# Patient Record
Sex: FEMALE | Race: BLACK OR AFRICAN AMERICAN
[De-identification: names, ages, dates, MRNs, and addresses within clinical notes are randomized per-mention and may not be internally consistent; named-entity substitution may affect disease eponyms.]

---

## 2017-07-10 ENCOUNTER — HOSPITAL ENCOUNTER (EMERGENCY)
Dept: HOSPITAL 17 - NEPE | Age: 27
LOS: 1 days | Discharge: HOME | End: 2017-07-11
Payer: SELF-PAY

## 2017-07-10 VITALS
RESPIRATION RATE: 16 BRPM | OXYGEN SATURATION: 100 % | HEART RATE: 90 BPM | DIASTOLIC BLOOD PRESSURE: 63 MMHG | TEMPERATURE: 99.8 F | SYSTOLIC BLOOD PRESSURE: 127 MMHG

## 2017-07-10 VITALS — HEIGHT: 64 IN | WEIGHT: 165.35 LBS | BODY MASS INDEX: 28.23 KG/M2

## 2017-07-10 DIAGNOSIS — Z72.0: ICD-10-CM

## 2017-07-10 DIAGNOSIS — Z79.899: ICD-10-CM

## 2017-07-10 DIAGNOSIS — N39.0: Primary | ICD-10-CM

## 2017-07-10 DIAGNOSIS — R11.2: ICD-10-CM

## 2017-07-10 LAB
BASOPHILS # BLD AUTO: 0 TH/MM3 (ref 0–0.2)
BASOPHILS NFR BLD: 0.4 % (ref 0–2)
COLOR UR: YELLOW
COMMENT (UR): (no result)
CULTURE IF INDICATED: (no result)
EOSINOPHIL # BLD: 0 TH/MM3 (ref 0–0.4)
EOSINOPHIL NFR BLD: 0.2 % (ref 0–4)
ERYTHROCYTE [DISTWIDTH] IN BLOOD BY AUTOMATED COUNT: 17.9 % (ref 11.6–17.2)
GLUCOSE UR STRIP-MCNC: (no result) MG/DL
HCT VFR BLD CALC: 31.3 % (ref 35–46)
HEMO FLAGS: (no result)
HGB UR QL STRIP: (no result)
KETONES UR STRIP-MCNC: 40 MG/DL
LYMPHOCYTES # BLD AUTO: 1.3 TH/MM3 (ref 1–4.8)
LYMPHOCYTES NFR BLD AUTO: 13.6 % (ref 9–44)
MCH RBC QN AUTO: 18.5 PG (ref 27–34)
MCHC RBC AUTO-ENTMCNC: 30.8 % (ref 32–36)
MCV RBC AUTO: 60.2 FL (ref 80–100)
MONOCYTES NFR BLD: 5.4 % (ref 0–8)
MUCOUS THREADS #/AREA URNS LPF: (no result) /LPF
NEUTROPHILS # BLD AUTO: 7.9 TH/MM3 (ref 1.8–7.7)
NEUTROPHILS NFR BLD AUTO: 80.4 % (ref 16–70)
NITRITE UR QL STRIP: (no result)
PLATELET # BLD: 336 TH/MM3 (ref 150–450)
RBC # BLD AUTO: 5.19 MIL/MM3 (ref 4–5.3)
SP GR UR STRIP: 1.03 (ref 1–1.03)
SQUAMOUS #/AREA URNS HPF: 8 /HPF (ref 0–5)
TRANS CELLS #/AREA URNS HPF: <1 /HPF
WBC # BLD AUTO: 9.8 TH/MM3 (ref 4–11)

## 2017-07-10 PROCEDURE — 83690 ASSAY OF LIPASE: CPT

## 2017-07-10 PROCEDURE — 80053 COMPREHEN METABOLIC PANEL: CPT

## 2017-07-10 PROCEDURE — 85025 COMPLETE CBC W/AUTO DIFF WBC: CPT

## 2017-07-10 PROCEDURE — 81001 URINALYSIS AUTO W/SCOPE: CPT

## 2017-07-10 PROCEDURE — 84703 CHORIONIC GONADOTROPIN ASSAY: CPT

## 2017-07-10 PROCEDURE — 99284 EMERGENCY DEPT VISIT MOD MDM: CPT

## 2017-07-10 PROCEDURE — 96361 HYDRATE IV INFUSION ADD-ON: CPT

## 2017-07-10 PROCEDURE — 87086 URINE CULTURE/COLONY COUNT: CPT

## 2017-07-10 PROCEDURE — 96374 THER/PROPH/DIAG INJ IV PUSH: CPT

## 2017-07-10 NOTE — PD
HPI


Chief Complaint:  Fever


Time Seen by Provider:  23:15


Travel History


International Travel<30 days:  No


Contact w/Intl Traveler<30days:  No


Traveled to known affect area:  No





History of Present Illness


HPI


27-year-old female came to the emergency room with history of fever, nausea and 

vomiting since this morning.  Patient says she vomited 6-7 times.  The last 

vomitus was half an hour ago before coming to the hospital.  No history of 

diarrhea.  Her temperature in triage was 99.  Patient did not take any 

antipyretics before coming in.  She is otherwise a healthy person.





[facility]


Past Medical History


*** Narrative Medical


List of her past medical, surgical, social and family history is reviewed from 

the nursing note.


Medical History:  Denies Significant Hx


Diminished Hearing:  No


Immunizations Current:  Yes


Tetanus Vaccination:  < 5 Years


Influenza Vaccination:  Yes


Pregnant?:  Not Pregnant


LMP:  7/10/17


:  2


Para:  2


Miscarriage:  0


:  0





Past Surgical History


Surgical History:  No Previous Surgery





Social History


Alcohol Use:  Yes (OCC)


Tobacco Use:  Yes


Substance Use:  No





Allergies-Medications


(Allergen,Severity, Reaction):  


Uncoded Allergies:  


     CITRUS FRUIT (Allergy, Severe, ANAPHYLAXIS, 09)


Comments


List of her allergies reviewed from the nursing note.


Reported Meds & Prescriptions





Reported Meds & Active Scripts


Active


Zofran Odt (Ondansetron Odt) 4 Mg Tab 4 Mg SL Q6HR PRN


Macrobid (Nitrofurantoin Monoh/Nitrofur Macro) 100 Mg Cap 100 Mg PO BID 10 Days





Narrative Medication


List of her home medications reviewed from the nursing note.





Review of Systems


Except as stated in HPI:  all other systems reviewed are Neg





Physical Exam


Narrative


GENERAL: Awake, alert, moderate distress


SKIN: Focused skin assessment warm/dry.


HEAD: Atraumatic. Normocephalic. 


EYES: Pupils equal and round. No scleral icterus. No injection or drainage. 


ENT: No nasal bleeding or discharge.  Mucous membranes pink and moist.


NECK: Trachea midline. No JVD. 


CARDIOVASCULAR: Regular rate and rhythm.  No murmur appreciated.


RESPIRATORY: No accessory muscle use. Clear to auscultation. Breath sounds 

equal bilaterally. 


GASTROINTESTINAL: Abdomen soft, non-tender, nondistended. Hepatic and splenic 

margins not palpable. 


MUSCULOSKELETAL: No obvious deformities. No clubbing.  No cyanosis.  No edema. 


NEUROLOGICAL: Awake and alert. No obvious cranial nerve deficits.  Motor 

grossly within normal limits. Normal speech.


PSYCHIATRIC: Appropriate mood and affect; insight and judgment normal.





Data


Data


Last Documented VS





Vital Signs








  Date Time  Temp Pulse Resp B/P Pulse Ox O2 Delivery O2 Flow Rate FiO2


 


17 00:45      Room Air  


 


7/10/17 23:16     97   


 


7/10/17 22:10 99.8 90 16 127/63    








Orders





 Complete Blood Count With Diff (7/10/17 23:18)


Comprehensive Metabolic Panel (7/10/17 23:18)


Lipase (7/10/17 23:18)


Urinalysis - C+S If Indicated (7/10/17 23:18)


Iv Access Insert/Monitor (7/10/17 23:18)


Ecg Monitoring (7/10/17 23:18)


Oximetry (7/10/17 23:18)


Ondansetron Inj (Zofran Inj) (7/10/17 23:30)


Sodium Chlor 0.9% 1000 Ml Inj (Ns 1000 M (7/10/17 23:18)


Sodium Chloride 0.9% Flush (Ns Flush) (7/10/17 23:30)


Ed Urine Pregnancytest Poc (7/10/17 23:18)


Urine Culture (7/10/17 23:43)


Nitrofurantoin Monohyd Macrocr (Macrobid (17 00:15)





Labs





 Laboratory Tests








Test 7/10/17





 23:43


 


White Blood Count 9.8 TH/MM3


 


Red Blood Count 5.19 MIL/MM3


 


Hemoglobin 9.6 GM/DL


 


Hematocrit 31.3 %


 


Mean Corpuscular Volume 60.2 FL


 


Mean Corpuscular Hemoglobin 18.5 PG


 


Mean Corpuscular Hemoglobin 30.8 %





Concent 


 


Red Cell Distribution Width 17.9 %


 


Platelet Count 336 TH/MM3


 


Mean Platelet Volume 9.8 FL


 


Neutrophils (%) (Auto) 80.4 %


 


Lymphocytes (%) (Auto) 13.6 %


 


Monocytes (%) (Auto) 5.4 %


 


Eosinophils (%) (Auto) 0.2 %


 


Basophils (%) (Auto) 0.4 %


 


Neutrophils # (Auto) 7.9 TH/MM3


 


Lymphocytes # (Auto) 1.3 TH/MM3


 


Monocytes # (Auto) 0.5 TH/MM3


 


Eosinophils # (Auto) 0.0 TH/MM3


 


Basophils # (Auto) 0.0 TH/MM3


 


CBC Comment DIFF FINAL 


 


Differential Comment  


 


Urine Color YELLOW 


 


Urine Turbidity HAZY 


 


Urine pH 6.0 


 


Urine Specific Gravity 1.032 


 


Urine Protein 30 mg/dL


 


Urine Glucose (UA) NEG mg/dL


 


Urine Ketones 40 mg/dL


 


Urine Occult Blood NEG 


 


Urine Nitrite NEG 


 


Urine Bilirubin NEG 


 


Urine Urobilinogen 2.0 MG/DL


 


Urine Leukocyte Esterase LARGE 


 


Urine RBC 3 /hpf


 


Urine WBC 23 /hpf


 


Urine Squamous Epithelial 8 /hpf





Cells 


 


Urine Transitional Epithelial <1 /hpf





Cells 


 


Urine Mucus MANY /lpf


 


Microscopic Urinalysis Comment CULTURE





 INDICATED


 


Sodium Level 135 MEQ/L


 


Potassium Level 3.6 MEQ/L


 


Chloride Level 104 MEQ/L


 


Carbon Dioxide Level 21.4 MEQ/L


 


Anion Gap 10 MEQ/L


 


Blood Urea Nitrogen 11 MG/DL


 


Creatinine 0.87 MG/DL


 


Estimat Glomerular Filtration 96 ML/MIN





Rate 


 


Random Glucose 102 MG/DL


 


Calcium Level 8.5 MG/DL


 


Total Bilirubin 0.4 MG/DL


 


Aspartate Amino Transf 18 U/L





(AST/SGOT) 


 


Alanine Aminotransferase 16 U/L





(ALT/SGPT) 


 


Alkaline Phosphatase 75 U/L


 


Total Protein 8.0 GM/DL


 


Albumin 3.5 GM/DL


 


Lipase 92 U/L











[facility]


Medical Decision Making


Medical Screen Exam Complete:  Yes


Emergency Medical Condition:  Yes


Medical Record Reviewed:  Yes


Differential Diagnosis


Acute gastritis, viral illness, UTI, dehydration


Narrative Course


12:50 AM the test results of back and they are within acceptable limits.  UA is 

positive for UTI.  Patient was given IV fluid bolus and IV Zofran.  I have 

given her by mouth Macrobid as well.  She will be discharged home on 

prescription for Zofran and Macrobid.





Procedures


EKG Prior to Arrival:  No





Diagnosis





 Primary Impression:  


 UTI (urinary tract infection)


 Qualified Code:  N39.0 - Urinary tract infection without hematuria, site 

unspecified


 Additional Impression:  


 Vomiting


 Qualified Code:  R11.2 - Nausea and vomiting, intractability of vomiting not 

specified, unspecified vomiting type


Referrals:  


Primary Care Physician





***Additional Instructions:


Please return to the ER if the condition worsens or any other new concerns.  

Otherwise follow-up with your primary care in couple days.  Take the 

medications as per the prescription direction.  Drink lots of fluid to keep 

yourself hydrated.


***Med/Other Pt SpecificInfo:  Prescription(s) given


Scripts


Ondansetron Odt (Zofran Odt)4 Mg Tab4 Mg SL Q6HR PRN (Nausea/Vomiting) #15 TAB  

Ref 0


   Prov:Stephen Gary MD         17 


Nitrofurantoin Monohydrate Macrocrystals (Macrobid)100 Mg [id number] Mg PO BID  10 

Days  Ref 0


   Prov:Stephen Gary MD         17


Disposition:  01 DISCHARGE HOME


Condition:  Stable








Stephen Gary MD Jul 10, 2017 23:15





Stephen Gary MD Jul 10, 2017 23:15

## 2017-07-11 LAB
ALP SERPL-CCNC: 75 U/L (ref 45–117)
ALT SERPL-CCNC: 16 U/L (ref 10–53)
ANION GAP SERPL CALC-SCNC: 10 MEQ/L (ref 5–15)
AST SERPL-CCNC: 18 U/L (ref 15–37)
BILIRUB SERPL-MCNC: 0.4 MG/DL (ref 0.2–1)
BUN SERPL-MCNC: 11 MG/DL (ref 7–18)
CHLORIDE SERPL-SCNC: 104 MEQ/L (ref 98–107)
GFR SERPLBLD BASED ON 1.73 SQ M-ARVRAT: 96 ML/MIN (ref 89–?)
HCO3 BLD-SCNC: 21.4 MEQ/L (ref 21–32)
POTASSIUM SERPL-SCNC: 3.6 MEQ/L (ref 3.5–5.1)
SODIUM SERPL-SCNC: 135 MEQ/L (ref 136–145)

## 2017-07-28 ENCOUNTER — HOSPITAL ENCOUNTER (EMERGENCY)
Dept: HOSPITAL 17 - NEPA | Age: 27
Discharge: HOME | End: 2017-07-28
Payer: SELF-PAY

## 2017-07-28 VITALS
TEMPERATURE: 101 F | OXYGEN SATURATION: 98 % | RESPIRATION RATE: 15 BRPM | HEART RATE: 93 BPM | DIASTOLIC BLOOD PRESSURE: 55 MMHG | SYSTOLIC BLOOD PRESSURE: 116 MMHG

## 2017-07-28 DIAGNOSIS — J02.9: Primary | ICD-10-CM

## 2017-07-28 DIAGNOSIS — Z72.0: ICD-10-CM

## 2017-07-28 PROCEDURE — 99283 EMERGENCY DEPT VISIT LOW MDM: CPT

## 2017-07-28 NOTE — PD
HPI


Chief Complaint:  ENT Complaint


Time Seen by Provider:  19:49


Travel History


International Travel<30 days:  No


Contact w/Intl Traveler<30days:  No


Traveled to known affect area:  No





History of Present Illness


HPI


27-year-old female presents to the emergency department for evaluation of sore 

throat and fever that started yesterday morning.  Her daughter was seen here 

pediatrics and was diagnosed with strep pharyngitis.  She has the same 

symptoms.  The patient has not taken anything for her fever.  She denies any 

chest or shortness of breath.  No abdominal pain.  No diarrhea.  She reports no 

chronic medical problems and takes no prescribed medications.  Patient has no 

other complaints at this time.





[facility]


Past Medical History


Medical History:  Denies Significant Hx


Diminished Hearing:  No


Immunizations Current:  Yes


Pregnant?:  Not Pregnant


:  2


Para:  2


Miscarriage:  0


:  0





Past Surgical History


Surgical History:  No Previous Surgery





Social History


Alcohol Use:  Yes (OCC)


Tobacco Use:  Yes


Substance Use:  No





Allergies-Medications


(Allergen,Severity, Reaction):  


Uncoded Allergies:  


     CITRUS FRUIT (Allergy, Severe, ANAPHYLAXIS, 09)


Reported Meds & Prescriptions





Reported Meds & Active Scripts


Active








Review of Systems


Except as stated in HPI:  all other systems reviewed are Neg





Physical Exam


Narrative


GENERAL: Well-nourished, well-developed female patient, ambulatory.  Afebrile.


SKIN: Focused skin assessment warm/dry.


HEAD: Normocephalic.  Atraumatic.


ENT: Mucosa pink and moist.  Bilateral tonsils are erythematous with exudates. 

No uvular edema. No uvular, palatal, or tonsillar deviation. Airway patent. 

Nasal turbinates appear normal without nasal blood, purulent drainage or septal 

hematoma.  Bilateral tympanic membranes are clear without erythema or 

perforation.


EYES: No scleral icterus. No injection or drainage. 


NECK: Supple, trachea midline. No JVD or lymphadenopathy.


CARDIOVASCULAR: Regular rate and rhythm without murmurs, gallops, or rubs. 


RESPIRATORY: Breath sounds equal bilaterally. No accessory muscle use.  Lungs 

sounds are clear to auscultation.


GASTROINTESTINAL: Abdomen soft, non-tender, nondistended. 


MUSCULOSKELETAL: No cyanosis, or edema. 


BACK: Nontender without obvious deformity. No CVA tenderness.





Data


Data


Last Documented VS





Vital Signs








  Date Time  Temp Pulse Resp B/P Pulse Ox O2 Delivery O2 Flow Rate FiO2


 


17 19:09 101.0 93 15 116/55 98 Room Air  











[facility]


Medical Decision Making


Medical Screen Exam Complete:  Yes


Emergency Medical Condition:  Yes


Medical Record Reviewed:  Yes


Differential Diagnosis


Strep pharyngitis versus viral pharyngitis versus URI


Narrative Course


27-year-old female presents to the emergency department for evaluation of sore 

throat and fever that started yesterday morning.  Her daughter was just seen in 

pediatrics and diagnosed with strep pharyngitis.  She has the same symptoms.  

Patient is given ibuprofen 600 mg by mouth and Pen-Vee K 500 mg by mouth 

tablet.  She will be discharged prescription for pen VK.  She is instructed to 

take Tylenol and ibuprofen over-the-counter as needed and to warm somewhat 

water gargles.  She is to follow-up with a primary care physician or return for 

any acute worsening of symptoms.  Patient verbalizes agreement and 

understanding.





The patient was discharged in stable condition with instructions, including 

return instructions and follow up instructions.





Diagnosis





 Primary Impression:  


 Exudative pharyngitis


Referrals:  


Primary Care Physician


call for appointment


Patient Instructions:  General Instructions, Pharyngitis (ED)





***Additional Instructions:


Take antibiotic as directed until gone.  This is free at [facility].


Over-the-counter Tylenol every 4 hours as needed for fever, pain.  Over-the-

counter ibuprofen every 6-8 hours as needed for fever, pain.


Warm saltwater gargles.


Follow-up with your primary care physician.


Return to the emergency department for any acute worsening of symptoms.


***Med/Other Pt SpecificInfo:  Prescription(s) given


Scripts


Penicillin V Potassium 500 Mg [id number] Mg PO Q8H  10 Days  Ref 0


   Prov:Julia Castillo         17


Disposition:  01 DISCHARGE HOME


Condition:  Stable








Julia Castillo 2017 19:53

## 2018-03-08 ENCOUNTER — HOSPITAL ENCOUNTER (EMERGENCY)
Dept: HOSPITAL 17 - PHEFT | Age: 28
Discharge: HOME | End: 2018-03-08
Payer: SELF-PAY

## 2018-03-08 VITALS
HEART RATE: 75 BPM | RESPIRATION RATE: 18 BRPM | OXYGEN SATURATION: 100 % | TEMPERATURE: 99.4 F | DIASTOLIC BLOOD PRESSURE: 69 MMHG | SYSTOLIC BLOOD PRESSURE: 139 MMHG

## 2018-03-08 VITALS — BODY MASS INDEX: 26.01 KG/M2 | HEIGHT: 64 IN | WEIGHT: 152.34 LBS

## 2018-03-08 DIAGNOSIS — K08.89: ICD-10-CM

## 2018-03-08 DIAGNOSIS — O99.619: Primary | ICD-10-CM

## 2018-03-08 DIAGNOSIS — Z3A.00: ICD-10-CM

## 2018-03-08 PROCEDURE — 99283 EMERGENCY DEPT VISIT LOW MDM: CPT

## 2018-03-08 NOTE — PD
HPI


Chief Complaint:  Oral / Dental Pain or Problem


Time Seen by Provider:  20:01


Travel History


International Travel<30 days:  No


Contact w/Intl Traveler<30days:  No


Traveled to known affect area:  No





History of Present Illness


HPI


This is a 27-year-old female here with right sided dental pain 2 days.  She 

reports she has a decayed tooth at the site of the pain.  No fever or chills.  

Severity is mild.  Aggravated by hot/cold fluids and showing.  No alleviating 

factors.  Patient reports she took 2 home pregnancy tests today.  She denies 

any abdominal pain, vaginal bleeding, vaginal discharge.





[facility]


Past Medical History


Medical History:  Denies Significant Hx


Diminished Hearing:  No


Immunizations Current:  Yes


Tetanus Vaccination:  < 5 Years


Influenza Vaccination:  No


Pregnant?:  Pregnant


LMP:  18


:  2


Para:  2


Miscarriage:  0


:  0





Past Surgical History


Surgical History:  No Previous Surgery





Social History


Alcohol Use:  No


Tobacco Use:  No


Substance Use:  No





Allergies-Medications


(Allergen,Severity, Reaction):  


Uncoded Allergies:  


     CITRUS FRUIT (Allergy, Severe, ANAPHYLAXIS, 09)


Reported Meds & Prescriptions





Reported Meds & Active Scripts


Active


Penicillin V Potassium 500 Mg Tab 500 Mg PO Q8H 10 Days








Review of Systems


Except as stated in HPI:  all other systems reviewed are Neg


General / Constitutional:  No: Fever


Eyes:  No: Visual changes


HENT:  Positive: Dental Difficulties


Cardiovascular:  No: Chest Pain or Discomfort


Respiratory:  No: Shortness of Breath


Gastrointestinal:  No: Abdominal Pain


Genitourinary:  No: Dysuria


Musculoskeletal:  No: Pain


Skin:  No Rash


Neurologic:  No: Weakness





Physical Exam


Narrative


GENERAL: Alert well-appearing 27-year-old female


SKIN: Warm and dry.


HEAD: Normocephalic.


EYES: No injection or drainage. 


MOUTH: Patient points to the right upper first molar as the source of the pain.

  Mild gum erythema surrounding tooth.  No discernible abscess.  Uvula is 

midline.  Airway is patent


NECK: Supple


GASTROINTESTINAL: Abdomen soft, non-tender, nondistended.





Data


Data


Last Documented VS





Vital Signs








  Date Time  Temp Pulse Resp B/P (MAP) Pulse Ox O2 Delivery O2 Flow Rate FiO2


 


3/8/18 19:25 99.4 75 18 139/69 (92) 100   











[facility]


Medical Decision Making


Medical Screen Exam Complete:  Yes


Emergency Medical Condition:  Yes


Differential Diagnosis


Dental abscess, dental caries, dentalgia


Narrative Course


27-year-old female here with dental pain.  She is nontoxic appearing.  She was 

treated with penicillin.  She was told to follow-up with her dentist





Diagnosis





 Primary Impression:  


 Dentalgia


Referrals:  


Dentist





***Additional Instructions:  


Tylenol as needed for pain.


Antibiotics as directed.


Follow-up with her dentist


Scripts


Penicillin V Potassium (Penicillin V Potassium) 500 Mg Tab


500 MG PO Q6H for Infection for 7 Days, #28 TAB 0 Refills


   Prov: Rebecca Miller         3/8/18


Disposition:  01 DISCHARGE HOME


Condition:  Stable











Rebecca Miller Mar 8, 2018 20:21